# Patient Record
Sex: FEMALE | Race: WHITE | Employment: OTHER
[De-identification: names, ages, dates, MRNs, and addresses within clinical notes are randomized per-mention and may not be internally consistent; named-entity substitution may affect disease eponyms.]

---

## 2017-01-26 ENCOUNTER — OFFICE VISIT (OUTPATIENT)
Dept: ONCOLOGY | Facility: CLINIC | Age: 62
End: 2017-01-26

## 2017-01-26 ENCOUNTER — TELEPHONE (OUTPATIENT)
Dept: ONCOLOGY | Facility: CLINIC | Age: 62
End: 2017-01-26

## 2017-01-26 VITALS
BODY MASS INDEX: 33.3 KG/M2 | SYSTOLIC BLOOD PRESSURE: 118 MMHG | DIASTOLIC BLOOD PRESSURE: 70 MMHG | TEMPERATURE: 97.9 F | WEIGHT: 200.1 LBS | RESPIRATION RATE: 16 BRPM | HEART RATE: 66 BPM

## 2017-01-26 DIAGNOSIS — M85.80 OSTEOPENIA: ICD-10-CM

## 2017-01-26 DIAGNOSIS — M81.0 AGE-RELATED OSTEOPOROSIS WITHOUT CURRENT PATHOLOGICAL FRACTURE: ICD-10-CM

## 2017-01-26 DIAGNOSIS — C50.111 MALIGNANT NEOPLASM OF CENTRAL PORTION OF RIGHT FEMALE BREAST (HCC): Primary | ICD-10-CM

## 2017-01-26 PROCEDURE — G8419 CALC BMI OUT NRM PARAM NOF/U: HCPCS | Performed by: INTERNAL MEDICINE

## 2017-01-26 PROCEDURE — 3014F SCREEN MAMMO DOC REV: CPT | Performed by: INTERNAL MEDICINE

## 2017-01-26 PROCEDURE — 3017F COLORECTAL CA SCREEN DOC REV: CPT | Performed by: INTERNAL MEDICINE

## 2017-01-26 PROCEDURE — G8427 DOCREV CUR MEDS BY ELIG CLIN: HCPCS | Performed by: INTERNAL MEDICINE

## 2017-01-26 PROCEDURE — 99214 OFFICE O/P EST MOD 30 MIN: CPT | Performed by: INTERNAL MEDICINE

## 2017-01-26 PROCEDURE — 4005F PHARM THX FOR OP RXD: CPT | Performed by: INTERNAL MEDICINE

## 2017-01-26 PROCEDURE — 1036F TOBACCO NON-USER: CPT | Performed by: INTERNAL MEDICINE

## 2017-01-26 PROCEDURE — G8484 FLU IMMUNIZE NO ADMIN: HCPCS | Performed by: INTERNAL MEDICINE

## 2017-01-26 RX ORDER — AMOXICILLIN AND CLAVULANATE POTASSIUM 875; 125 MG/1; MG/1
1 TABLET, FILM COATED ORAL 2 TIMES DAILY
COMMUNITY

## 2017-01-26 RX ORDER — ANASTROZOLE 1 MG/1
1 TABLET ORAL DAILY
Qty: 30 TABLET | Refills: 6 | Status: SHIPPED | OUTPATIENT
Start: 2017-01-26 | End: 2017-03-01 | Stop reason: SINTOL

## 2017-03-01 ENCOUNTER — TELEPHONE (OUTPATIENT)
Dept: ONCOLOGY | Facility: CLINIC | Age: 62
End: 2017-03-01

## 2017-03-09 ENCOUNTER — TELEPHONE (OUTPATIENT)
Dept: ONCOLOGY | Facility: CLINIC | Age: 62
End: 2017-03-09

## 2017-03-09 ENCOUNTER — OFFICE VISIT (OUTPATIENT)
Dept: ONCOLOGY | Facility: CLINIC | Age: 62
End: 2017-03-09

## 2017-03-09 ENCOUNTER — HOSPITAL ENCOUNTER (OUTPATIENT)
Dept: INFUSION THERAPY | Facility: MEDICAL CENTER | Age: 62
Discharge: HOME | End: 2017-03-09
Payer: MEDICARE

## 2017-03-09 ENCOUNTER — HOSPITAL ENCOUNTER (OUTPATIENT)
Age: 62
Discharge: HOME | End: 2017-03-09
Payer: MEDICARE

## 2017-03-09 VITALS
DIASTOLIC BLOOD PRESSURE: 70 MMHG | RESPIRATION RATE: 20 BRPM | SYSTOLIC BLOOD PRESSURE: 120 MMHG | HEART RATE: 68 BPM | TEMPERATURE: 98 F

## 2017-03-09 DIAGNOSIS — C50.111 MALIGNANT NEOPLASM OF CENTRAL PORTION OF RIGHT FEMALE BREAST (HCC): ICD-10-CM

## 2017-03-09 DIAGNOSIS — C50.111 MALIGNANT NEOPLASM OF CENTRAL PORTION OF RIGHT FEMALE BREAST (HCC): Primary | ICD-10-CM

## 2017-03-09 PROCEDURE — 99214 OFFICE O/P EST MOD 30 MIN: CPT | Performed by: INTERNAL MEDICINE

## 2017-03-09 PROCEDURE — 3017F COLORECTAL CA SCREEN DOC REV: CPT | Performed by: INTERNAL MEDICINE

## 2017-03-09 PROCEDURE — 3014F SCREEN MAMMO DOC REV: CPT | Performed by: INTERNAL MEDICINE

## 2017-03-09 PROCEDURE — 6360000002 HC RX W HCPCS

## 2017-03-09 PROCEDURE — 1036F TOBACCO NON-USER: CPT | Performed by: INTERNAL MEDICINE

## 2017-03-09 PROCEDURE — 2580000003 HC RX 258

## 2017-03-09 PROCEDURE — 99212 OFFICE O/P EST SF 10 MIN: CPT | Performed by: INTERNAL MEDICINE

## 2017-03-09 PROCEDURE — 96523 IRRIG DRUG DELIVERY DEVICE: CPT

## 2017-03-09 RX ORDER — EXEMESTANE 25 MG/1
25 TABLET ORAL DAILY
Qty: 30 TABLET | Refills: 3 | Status: SHIPPED | OUTPATIENT
Start: 2017-03-09 | End: 2017-04-20 | Stop reason: SDUPTHER

## 2017-03-09 RX ORDER — SODIUM CHLORIDE 0.9 % (FLUSH) 0.9 %
20 SYRINGE (ML) INJECTION PRN
Status: CANCELLED | OUTPATIENT
Start: 2017-03-09

## 2017-03-09 RX ORDER — SODIUM CHLORIDE 0.9 % (FLUSH) 0.9 %
20 SYRINGE (ML) INJECTION PRN
Status: DISCONTINUED | OUTPATIENT
Start: 2017-03-09 | End: 2017-03-10 | Stop reason: HOSPADM

## 2017-03-09 RX ORDER — HEPARIN SODIUM (PORCINE) LOCK FLUSH IV SOLN 100 UNIT/ML 100 UNIT/ML
500 SOLUTION INTRAVENOUS PRN
Status: DISCONTINUED | OUTPATIENT
Start: 2017-03-09 | End: 2017-03-10 | Stop reason: HOSPADM

## 2017-03-09 RX ORDER — HEPARIN SODIUM (PORCINE) LOCK FLUSH IV SOLN 100 UNIT/ML 100 UNIT/ML
500 SOLUTION INTRAVENOUS PRN
Status: CANCELLED | OUTPATIENT
Start: 2017-03-09

## 2017-03-09 RX ADMIN — SODIUM CHLORIDE, PRESERVATIVE FREE 500 UNITS: 5 INJECTION INTRAVENOUS at 14:40

## 2017-03-09 RX ADMIN — Medication 10 ML: at 14:41

## 2017-03-09 NOTE — PROGRESS NOTES
Met with patient and daughter for survivor visit. Questions and concerns addressed. Education materials and treatment summary given. Instructed to call as needed.

## 2017-03-09 NOTE — IP AVS SNAPSHOT
After Visit Summary    Medication List for Home    Based on the information you provided to us as well as any changes during this visit, the following is your updated medication list.  Compare this with your prescription bottles at home. If you have any questions or concerns, contact your primary care physician's office. Daily Medication List (This medication list can be shared with any healthcare provider who is helping you manage your medications)      ASK your doctor about these medications if you have questions        Last Dose    Next Dose Due AM NOON PM NIGHT    ALPRAZolam 0.5 MG tablet   Commonly known as:  XANAX   Take 0.5 mg by mouth 4 times daily. amoxicillin-clavulanate 875-125 MG per tablet   Commonly known as:  AUGMENTIN   Take 1 tablet by mouth 2 times daily                                         aspirin 81 MG EC tablet   Take 1 tablet by mouth daily                                         citalopram 20 MG tablet   Commonly known as:  CELEXA   Take 40 mg by mouth daily                                         cloNIDine 0.1 MG tablet   Commonly known as:  CATAPRES   Take 0.1 mg by mouth 2 times daily. cyanocobalamin 1000 MCG/ML injection   Inject 1,000 mcg into the muscle every 7 days. 1-2 cc per week                                         exemestane 25 MG chemo tablet   Commonly known as:  AROMASIN   Take 1 tablet by mouth daily                                         hydrochlorothiazide 25 MG tablet   Commonly known as:  HYDRODIURIL   Take 25 mg by mouth daily. levothyroxine 175 MCG tablet   Commonly known as:  SYNTHROID   Take 1 tablet by mouth daily                                         lidocaine-prilocaine 2.5-2.5 % cream   Commonly known as:  EMLA   Apply topically as needed.                                          magnesium gluconate 500 MG tablet Commonly known as:  MAGONATE   Take 600 mg by mouth 2 times daily                                         omeprazole 20 MG delayed release capsule   Commonly known as:  PRILOSEC   Take 20 mg by mouth daily. oxyCODONE 15 MG immediate release tablet   Commonly known as:  OXY-IR   Take 30 mg by mouth 4 times daily as needed for Pain                                         potassium chloride 20 MEQ packet   Commonly known as:  KLOR-CON   Take 20 mEq by mouth daily                                         PROBIOTIC DAILY PO   Take  by mouth daily. prochlorperazine 10 MG tablet   Commonly known as:  COMPAZINE   TAKE 1 TABLET BY MOUTH EVERY 6 HOURS AS NEEDED                                         therapeutic multivitamin-minerals tablet   Take 1 tablet by mouth daily. tiZANidine 4 MG tablet   Commonly known as:  ZANAFLEX   Take 4 mg by mouth 3 times daily as needed. Where to Get Your Medications      These medications were sent to [address] [address] [facility] - P [phone] - F [phone]  [address] [facility] [address] [facility] [address]     Phone:  [phone]     exemestane 25 MG chemo tablet               Allergies as of 3/9/2017        Reactions    Neurontin [Gabapentin]       Immunizations as of 3/9/2017     Name Date Dose VIS Date Route    Influenza Virus Vaccine 10/22/2015 0.5 mL 8/7/2015 Intramuscular         After Visit Summary    This summary was created for you. Thank you for entrusting your care to us.   The following information includes details about your hospital/visit stay along with steps you should take to help with your recovery once you leave the hospital.  In this packet, you will find information about the topics listed below:    · Instructions about your medications including a list of your home medications  · A summary of your hospital visit  · Follow-up appointments once you have left the hospital  · Your care plan at home      You may receive a survey regarding the care you received during your stay. Your input is valuable to us. We encourage you to complete and return your survey in the envelope provided. We hope you will choose us in the future for your healthcare needs. Patient Information     Patient Name ABDON Coombs 1955      Care Provided at:     Name             [address] [facility] [address]              Your Visit    Here you will find information about your visit, including the reason for your visit. Please take this sheet with you when you visit your doctor or other health care provider in the future. It will help determine the best possible medical care for you at that time. If you have any questions once you leave the hospital, please call the department phone number listed below. Why you were here     Your primary diagnosis was:  Not on File      Visit Information     Date & Time Department Dept. Phone    3/9/2017 Becki Barone 47 [phone]       Follow-up Appointments    Below is a list of your follow-up and future appointments. This may not be a complete list as you may have made appointments directly with providers that we are not aware of or your providers may have made some for you. Please call your providers to confirm appointments. It is important to keep your appointments. Please bring your current insurance card, photo ID, co-pay, and all medication bottles to your appointment. If self-pay, payment is expected at the time of service. Future Appointments     3/29/2017 1:00 PM     Appointment with TOMAS MAMMO TECH 1; TOMAS DEXA RM at [address] ([phone])   Discontinue taking calcium 24 hours prior to dexa scan.     [address] [facility] [address]       2017 2:30 PM Appointment with MITUL MILLS 05 at . [address] [phone] [place]       4/20/2017 3:00 PM     Appointment with Cecilia Delong MD at [facility] ([phone])   Please arrive 15 minutes prior to appointment time, bring insurance card and photo ID.    [place] [id number]              Care Plan Once You Return Home    This section includes instructions you will need to follow once you leave the hospital.  Your care team will discuss these with you, so you and those caring for you know how to best care for your health needs at home. This section may also include educational information about certain health topics that may be of help to you. MyChart Signup     Our records indicate that you have declined MyChart signup. View your information online  ? Review your current list of  medications, immunization, and allergies. ? Review your future test results online . ? Review your discharge instructions provided by your caregivers at discharge    Certain functionality such as prescription refills, scheduling appointments or sending messages to your provider are not activated if your provider does not use [employer] in his/her office    For questions regarding your MyChart account call [phone]. If you have a clinical question, please call your doctor's office. The information on all pages of the After Visit Summary has been reviewed with me, the patient and/or responsible adult, by my health care provider(s). I had the opportunity to ask questions regarding this information. I understand I should dispose of my armband safely at home to protect my health information. A complete copy of the After Visit Summary has been given to me, the patient and/or responsible adult.            Patient Signature/Responsible Adult:____________________    Clinician Signature:_____________________ Date:_____________________    Time:_____________________

## 2017-03-09 NOTE — PROGRESS NOTES
Holden Dhruv arrives per wheelchair with daughter  Order for port flush  Lt chest port accessed per policy with #34 G 3/4 L Bah needle   Good blood return noted  Bah needle flushed per protocol and bah needle removed with needle intact  Band aid applied  Discharged with daughter to home

## 2017-03-09 NOTE — IP AVS SNAPSHOT
Patient Information     Patient Name ABDON Ruiz 1955         This is your updated medication list to keep with you all times      ASK your doctor about these medications     ALPRAZolam 0.5 MG tablet   Commonly known as:  XANAX       amoxicillin-clavulanate 875-125 MG per tablet   Commonly known as:  AUGMENTIN       aspirin 81 MG EC tablet   Take 1 tablet by mouth daily       citalopram 20 MG tablet   Commonly known as:  CELEXA       cloNIDine 0.1 MG tablet   Commonly known as:  CATAPRES       cyanocobalamin 1000 MCG/ML injection       exemestane 25 MG chemo tablet   Commonly known as:  AROMASIN   Take 1 tablet by mouth daily       hydrochlorothiazide 25 MG tablet   Commonly known as:  HYDRODIURIL       levothyroxine 175 MCG tablet   Commonly known as:  SYNTHROID   Take 1 tablet by mouth daily       lidocaine-prilocaine 2.5-2.5 % cream   Commonly known as:  EMLA   Apply topically as needed.        magnesium gluconate 500 MG tablet   Commonly known as:  MAGONATE       omeprazole 20 MG delayed release capsule   Commonly known as:  PRILOSEC       oxyCODONE 15 MG immediate release tablet   Commonly known as:  OXY-IR       potassium chloride 20 MEQ packet   Commonly known as:  KLOR-CON   Take 20 mEq by mouth daily       PROBIOTIC DAILY PO       prochlorperazine 10 MG tablet   Commonly known as:  COMPAZINE   TAKE 1 TABLET BY MOUTH EVERY 6 HOURS AS NEEDED       therapeutic multivitamin-minerals tablet       tiZANidine 4 MG tablet   Commonly known as:  Deloris Lew

## 2017-03-17 ENCOUNTER — TELEPHONE (OUTPATIENT)
Dept: INFUSION THERAPY | Facility: MEDICAL CENTER | Age: 62
End: 2017-03-17

## 2017-04-20 ENCOUNTER — OFFICE VISIT (OUTPATIENT)
Dept: ONCOLOGY | Age: 62
End: 2017-04-20
Payer: MEDICARE

## 2017-04-20 ENCOUNTER — HOSPITAL ENCOUNTER (OUTPATIENT)
Dept: INFUSION THERAPY | Facility: MEDICAL CENTER | Age: 62
Discharge: HOME | End: 2017-04-20
Payer: MEDICARE

## 2017-04-20 ENCOUNTER — TELEPHONE (OUTPATIENT)
Dept: ONCOLOGY | Age: 62
End: 2017-04-20

## 2017-04-20 VITALS
TEMPERATURE: 98.2 F | RESPIRATION RATE: 18 BRPM | DIASTOLIC BLOOD PRESSURE: 61 MMHG | SYSTOLIC BLOOD PRESSURE: 114 MMHG | HEART RATE: 70 BPM

## 2017-04-20 DIAGNOSIS — C50.111 MALIGNANT NEOPLASM OF CENTRAL PORTION OF RIGHT FEMALE BREAST (HCC): Primary | ICD-10-CM

## 2017-04-20 PROCEDURE — 6360000002 HC RX W HCPCS: Performed by: INTERNAL MEDICINE

## 2017-04-20 PROCEDURE — 96523 IRRIG DRUG DELIVERY DEVICE: CPT

## 2017-04-20 PROCEDURE — 99212 OFFICE O/P EST SF 10 MIN: CPT

## 2017-04-20 PROCEDURE — 3014F SCREEN MAMMO DOC REV: CPT | Performed by: INTERNAL MEDICINE

## 2017-04-20 PROCEDURE — 2580000003 HC RX 258: Performed by: INTERNAL MEDICINE

## 2017-04-20 PROCEDURE — G8417 CALC BMI ABV UP PARAM F/U: HCPCS | Performed by: INTERNAL MEDICINE

## 2017-04-20 PROCEDURE — 99214 OFFICE O/P EST MOD 30 MIN: CPT | Performed by: INTERNAL MEDICINE

## 2017-04-20 PROCEDURE — 1036F TOBACCO NON-USER: CPT | Performed by: INTERNAL MEDICINE

## 2017-04-20 PROCEDURE — G8427 DOCREV CUR MEDS BY ELIG CLIN: HCPCS | Performed by: INTERNAL MEDICINE

## 2017-04-20 PROCEDURE — 3017F COLORECTAL CA SCREEN DOC REV: CPT | Performed by: INTERNAL MEDICINE

## 2017-04-20 RX ORDER — FUROSEMIDE 40 MG/1
40 TABLET ORAL DAILY
Qty: 30 TABLET | Refills: 1 | Status: SHIPPED | OUTPATIENT
Start: 2017-04-20 | End: 2017-06-06 | Stop reason: SDUPTHER

## 2017-04-20 RX ORDER — EXEMESTANE 25 MG/1
25 TABLET ORAL DAILY
Qty: 30 TABLET | Refills: 3 | Status: SHIPPED | OUTPATIENT
Start: 2017-04-20 | End: 2018-11-01 | Stop reason: ALTCHOICE

## 2017-04-20 RX ORDER — SODIUM CHLORIDE 0.9 % (FLUSH) 0.9 %
10 SYRINGE (ML) INJECTION PRN
Status: DISCONTINUED | OUTPATIENT
Start: 2017-04-20 | End: 2017-04-21 | Stop reason: HOSPADM

## 2017-04-20 RX ADMIN — SODIUM CHLORIDE, PRESERVATIVE FREE 500 UNITS: 5 INJECTION INTRAVENOUS at 15:35

## 2017-04-20 RX ADMIN — Medication 10 ML: at 15:36

## 2017-05-04 ENCOUNTER — TELEPHONE (OUTPATIENT)
Dept: CASE MANAGEMENT | Age: 62
End: 2017-05-04

## 2017-05-24 ENCOUNTER — HOSPITAL ENCOUNTER (OUTPATIENT)
Dept: INFUSION THERAPY | Facility: MEDICAL CENTER | Age: 62
Discharge: HOME | End: 2017-05-24
Payer: MEDICARE

## 2017-05-24 ENCOUNTER — HOSPITAL ENCOUNTER (OUTPATIENT)
Dept: MAMMOGRAPHY | Age: 62
Discharge: HOME | End: 2017-05-24
Payer: MEDICARE

## 2017-05-24 VITALS
RESPIRATION RATE: 18 BRPM | SYSTOLIC BLOOD PRESSURE: 145 MMHG | DIASTOLIC BLOOD PRESSURE: 64 MMHG | HEART RATE: 65 BPM | TEMPERATURE: 98.3 F

## 2017-05-24 DIAGNOSIS — M81.0 AGE-RELATED OSTEOPOROSIS WITHOUT CURRENT PATHOLOGICAL FRACTURE: ICD-10-CM

## 2017-05-24 DIAGNOSIS — C50.111 MALIGNANT NEOPLASM OF CENTRAL PORTION OF RIGHT FEMALE BREAST (HCC): ICD-10-CM

## 2017-05-24 DIAGNOSIS — M85.80 OSTEOPENIA: ICD-10-CM

## 2017-05-24 PROCEDURE — 77080 DXA BONE DENSITY AXIAL: CPT

## 2017-05-24 PROCEDURE — 96523 IRRIG DRUG DELIVERY DEVICE: CPT

## 2017-05-24 PROCEDURE — 6360000002 HC RX W HCPCS

## 2017-05-24 PROCEDURE — 2580000003 HC RX 258

## 2017-05-24 RX ORDER — HEPARIN SODIUM (PORCINE) LOCK FLUSH IV SOLN 100 UNIT/ML 100 UNIT/ML
500 SOLUTION INTRAVENOUS PRN
Status: DISCONTINUED | OUTPATIENT
Start: 2017-05-24 | End: 2017-05-25 | Stop reason: HOSPADM

## 2017-05-24 RX ORDER — HEPARIN SODIUM (PORCINE) LOCK FLUSH IV SOLN 100 UNIT/ML 100 UNIT/ML
500 SOLUTION INTRAVENOUS PRN
Status: CANCELLED | OUTPATIENT
Start: 2017-05-24

## 2017-05-24 RX ORDER — SODIUM CHLORIDE 0.9 % (FLUSH) 0.9 %
20 SYRINGE (ML) INJECTION PRN
Status: CANCELLED | OUTPATIENT
Start: 2017-05-24

## 2017-05-24 RX ORDER — SODIUM CHLORIDE 0.9 % (FLUSH) 0.9 %
20 SYRINGE (ML) INJECTION PRN
Status: DISCONTINUED | OUTPATIENT
Start: 2017-05-24 | End: 2017-05-25 | Stop reason: HOSPADM

## 2017-05-24 RX ADMIN — Medication 20 ML: at 14:53

## 2017-05-24 RX ADMIN — SODIUM CHLORIDE, PRESERVATIVE FREE 500 UNITS: 5 INJECTION INTRAVENOUS at 14:53

## 2017-05-24 NOTE — PROGRESS NOTES
Pt. Arrives for port flush. Pt. C/o lower leg and ankle edema, but better since starting lasix. Pt. Spoke with MD today and legs evaluated, MD suggesting pt. See PCP for evaluation . MD suggesting stockings for residual edema, pt. Worried about celulitis. PCP could adjust lasix and continue to order ATB if needed.

## 2017-06-09 RX ORDER — FUROSEMIDE 40 MG/1
TABLET ORAL
Qty: 30 TABLET | Refills: 1 | Status: SHIPPED | OUTPATIENT
Start: 2017-06-09 | End: 2017-10-13 | Stop reason: SDUPTHER

## 2017-06-13 ENCOUNTER — HOSPITAL ENCOUNTER (OUTPATIENT)
Dept: INFUSION THERAPY | Facility: MEDICAL CENTER | Age: 62
Discharge: HOME | End: 2017-06-13
Payer: MEDICARE

## 2017-06-13 VITALS
HEART RATE: 65 BPM | SYSTOLIC BLOOD PRESSURE: 117 MMHG | RESPIRATION RATE: 20 BRPM | TEMPERATURE: 97.9 F | DIASTOLIC BLOOD PRESSURE: 58 MMHG

## 2017-06-13 PROCEDURE — 96523 IRRIG DRUG DELIVERY DEVICE: CPT

## 2017-06-13 PROCEDURE — 2580000003 HC RX 258: Performed by: INTERNAL MEDICINE

## 2017-06-13 PROCEDURE — 6360000002 HC RX W HCPCS: Performed by: INTERNAL MEDICINE

## 2017-06-13 RX ORDER — SODIUM CHLORIDE 0.9 % (FLUSH) 0.9 %
20 SYRINGE (ML) INJECTION PRN
Status: DISCONTINUED | OUTPATIENT
Start: 2017-06-13 | End: 2017-06-14 | Stop reason: HOSPADM

## 2017-06-13 RX ADMIN — SODIUM CHLORIDE, PRESERVATIVE FREE 500 UNITS: 5 INJECTION INTRAVENOUS at 14:42

## 2017-06-13 RX ADMIN — Medication 20 ML: at 14:42

## 2017-06-13 NOTE — PROGRESS NOTES
Patient arrives to the clinic today with her daughter for a port flush. Patient arrives via wheelchair. Port accessed per policy, good blood return obtained. Gripper needle removed. Patient leaves in stable condition with her daughter.

## 2017-07-24 ENCOUNTER — HOSPITAL ENCOUNTER (OUTPATIENT)
Facility: MEDICAL CENTER | Age: 62
End: 2017-07-24
Payer: MEDICARE

## 2017-08-14 ENCOUNTER — HOSPITAL ENCOUNTER (OUTPATIENT)
Facility: MEDICAL CENTER | Age: 62
End: 2017-08-14
Payer: MEDICARE

## 2017-08-15 ENCOUNTER — TELEPHONE (OUTPATIENT)
Dept: INFUSION THERAPY | Facility: MEDICAL CENTER | Age: 62
End: 2017-08-15

## 2017-08-15 ENCOUNTER — APPOINTMENT (OUTPATIENT)
Dept: INFUSION THERAPY | Facility: MEDICAL CENTER | Age: 62
End: 2017-08-15
Payer: MEDICARE

## 2017-08-18 ENCOUNTER — HOSPITAL ENCOUNTER (OUTPATIENT)
Dept: INFUSION THERAPY | Facility: MEDICAL CENTER | Age: 62
Discharge: HOME | End: 2017-08-18
Payer: MEDICARE

## 2017-08-18 VITALS
DIASTOLIC BLOOD PRESSURE: 60 MMHG | TEMPERATURE: 98.5 F | RESPIRATION RATE: 18 BRPM | SYSTOLIC BLOOD PRESSURE: 121 MMHG | HEART RATE: 60 BPM

## 2017-08-18 DIAGNOSIS — C50.111 MALIGNANT NEOPLASM OF CENTRAL PORTION OF RIGHT FEMALE BREAST (HCC): ICD-10-CM

## 2017-08-18 PROCEDURE — 2580000003 HC RX 258

## 2017-08-18 PROCEDURE — 96523 IRRIG DRUG DELIVERY DEVICE: CPT

## 2017-08-18 PROCEDURE — 6360000002 HC RX W HCPCS

## 2017-08-18 RX ORDER — HEPARIN SODIUM (PORCINE) LOCK FLUSH IV SOLN 100 UNIT/ML 100 UNIT/ML
500 SOLUTION INTRAVENOUS PRN
Status: DISCONTINUED | OUTPATIENT
Start: 2017-08-18 | End: 2017-08-19 | Stop reason: HOSPADM

## 2017-08-18 RX ORDER — SODIUM CHLORIDE 0.9 % (FLUSH) 0.9 %
20 SYRINGE (ML) INJECTION PRN
Status: CANCELLED | OUTPATIENT
Start: 2017-08-18

## 2017-08-18 RX ORDER — SODIUM CHLORIDE 0.9 % (FLUSH) 0.9 %
20 SYRINGE (ML) INJECTION PRN
Status: DISCONTINUED | OUTPATIENT
Start: 2017-08-18 | End: 2017-08-19 | Stop reason: HOSPADM

## 2017-08-18 RX ORDER — HEPARIN SODIUM (PORCINE) LOCK FLUSH IV SOLN 100 UNIT/ML 100 UNIT/ML
500 SOLUTION INTRAVENOUS PRN
Status: CANCELLED | OUTPATIENT
Start: 2017-08-18

## 2017-08-18 RX ADMIN — SODIUM CHLORIDE, PRESERVATIVE FREE 500 UNITS: 5 INJECTION INTRAVENOUS at 15:35

## 2017-08-18 RX ADMIN — Medication 20 ML: at 15:35

## 2017-08-18 NOTE — PROGRESS NOTES
Pt. Arrives for [facility], has upcoming dental procedure. , but otherwise denies c/o. Pt. Adjusting MD F/U to coordinate with upcoming [facility] flush.

## 2017-09-15 ENCOUNTER — HOSPITAL ENCOUNTER (OUTPATIENT)
Facility: MEDICAL CENTER | Age: 62
End: 2017-09-15
Payer: MEDICARE

## 2017-09-25 ENCOUNTER — HOSPITAL ENCOUNTER (OUTPATIENT)
Facility: MEDICAL CENTER | Age: 62
End: 2017-09-25
Payer: MEDICARE

## 2017-10-13 RX ORDER — FUROSEMIDE 40 MG/1
TABLET ORAL
Qty: 30 TABLET | Refills: 1 | Status: SHIPPED | OUTPATIENT
Start: 2017-10-13 | End: 2018-11-01 | Stop reason: ALTCHOICE

## 2017-10-16 ENCOUNTER — HOSPITAL ENCOUNTER (OUTPATIENT)
Facility: MEDICAL CENTER | Age: 62
End: 2017-10-16
Payer: MEDICARE

## 2017-10-17 ENCOUNTER — HOSPITAL ENCOUNTER (OUTPATIENT)
Dept: INFUSION THERAPY | Facility: MEDICAL CENTER | Age: 62
End: 2017-10-17

## 2017-11-06 ENCOUNTER — HOSPITAL ENCOUNTER (OUTPATIENT)
Facility: MEDICAL CENTER | Age: 62
End: 2017-11-06
Payer: MEDICARE

## 2017-11-07 ENCOUNTER — OFFICE VISIT (OUTPATIENT)
Dept: ONCOLOGY | Age: 62
End: 2017-11-07
Payer: MEDICARE

## 2017-11-07 ENCOUNTER — HOSPITAL ENCOUNTER (OUTPATIENT)
Dept: INFUSION THERAPY | Facility: MEDICAL CENTER | Age: 62
Discharge: HOME | End: 2017-11-07
Payer: MEDICARE

## 2017-11-07 ENCOUNTER — TELEPHONE (OUTPATIENT)
Dept: ONCOLOGY | Age: 62
End: 2017-11-07

## 2017-11-07 VITALS
WEIGHT: 204.5 LBS | RESPIRATION RATE: 18 BRPM | SYSTOLIC BLOOD PRESSURE: 146 MMHG | TEMPERATURE: 98.2 F | BODY MASS INDEX: 34.03 KG/M2 | DIASTOLIC BLOOD PRESSURE: 79 MMHG | HEART RATE: 73 BPM

## 2017-11-07 DIAGNOSIS — Z17.0 MALIGNANT NEOPLASM OF CENTRAL PORTION OF RIGHT BREAST IN FEMALE, ESTROGEN RECEPTOR POSITIVE (HCC): Primary | ICD-10-CM

## 2017-11-07 DIAGNOSIS — C50.111 MALIGNANT NEOPLASM OF CENTRAL PORTION OF RIGHT BREAST IN FEMALE, ESTROGEN RECEPTOR POSITIVE (HCC): Primary | ICD-10-CM

## 2017-11-07 PROCEDURE — 99211 OFF/OP EST MAY X REQ PHY/QHP: CPT

## 2017-11-07 PROCEDURE — G8417 CALC BMI ABV UP PARAM F/U: HCPCS | Performed by: INTERNAL MEDICINE

## 2017-11-07 PROCEDURE — 1036F TOBACCO NON-USER: CPT | Performed by: INTERNAL MEDICINE

## 2017-11-07 PROCEDURE — 3014F SCREEN MAMMO DOC REV: CPT | Performed by: INTERNAL MEDICINE

## 2017-11-07 PROCEDURE — 99214 OFFICE O/P EST MOD 30 MIN: CPT | Performed by: INTERNAL MEDICINE

## 2017-11-07 PROCEDURE — G8427 DOCREV CUR MEDS BY ELIG CLIN: HCPCS | Performed by: INTERNAL MEDICINE

## 2017-11-07 PROCEDURE — 6360000002 HC RX W HCPCS: Performed by: INTERNAL MEDICINE

## 2017-11-07 PROCEDURE — 3017F COLORECTAL CA SCREEN DOC REV: CPT | Performed by: INTERNAL MEDICINE

## 2017-11-07 PROCEDURE — 96523 IRRIG DRUG DELIVERY DEVICE: CPT

## 2017-11-07 PROCEDURE — G8484 FLU IMMUNIZE NO ADMIN: HCPCS | Performed by: INTERNAL MEDICINE

## 2017-11-07 PROCEDURE — 2580000003 HC RX 258: Performed by: INTERNAL MEDICINE

## 2017-11-07 RX ORDER — HEPARIN SODIUM (PORCINE) LOCK FLUSH IV SOLN 100 UNIT/ML 100 UNIT/ML
500 SOLUTION INTRAVENOUS PRN
Status: DISCONTINUED | OUTPATIENT
Start: 2017-11-07 | End: 2017-11-08 | Stop reason: HOSPADM

## 2017-11-07 RX ORDER — SODIUM CHLORIDE 0.9 % (FLUSH) 0.9 %
20 SYRINGE (ML) INJECTION PRN
Status: DISCONTINUED | OUTPATIENT
Start: 2017-11-07 | End: 2017-11-08 | Stop reason: HOSPADM

## 2017-11-07 RX ADMIN — SODIUM CHLORIDE, PRESERVATIVE FREE 500 UNITS: 5 INJECTION INTRAVENOUS at 15:51

## 2017-11-07 RX ADMIN — Medication 20 ML: at 15:44

## 2017-11-07 RX ADMIN — Medication 20 ML: at 15:51

## 2017-11-07 NOTE — PROGRESS NOTES
Pt arrives per wheelchair and daughter with pt. Pt tolerated port flush well and pt seen per md and then to  per wheelchair with daughter.

## 2017-11-07 NOTE — PROGRESS NOTES
_         Reason for the visit:   Chief Complaint   Patient presents with   Georganna Duverney Follow-up     [facility]   //  Port flush , no labs ordered. Blood drawn if needed. Has some lower extremity edema, feels from pain pump. Pertinent Clinical Problems/ Treatments:    1. Right Breast invasive ductal carcinoma, Clinical stage , T2N0M0, ER strongly positive/TN strongly positive/HER-2 +3 by IHC ,initial diagnosis Nov 2014. Pathological stage [id number] after neoadjuvant chemotherapy. 2. S/p traumatic back injury, led to delayed RX of breast cancer  3. TCH-P started 9/9/15. Cycle #6 on 2/1/16. Herceptin continued every three weeks. Completed one year on 11/30/16. 4. Rt breast lumpectomy 5/27/16.   5. Radiation therapy not given due to prolonged gap after chemotherapy due to health problems and incompliance. 6. Started Arimidex January 2017. Discontinued due to side effects. Prescribed  Aromasin March 2017. Not started yet. He decided not to use it but she is worried about side effects. 7. Iron deficiency anemia    Summary of the case: The patient was diagnosed with breast invasive ductal carcinoma in right breast with no torrie involvement in 10/2014  on biopsy. She was sent to [facility], oncologist last year and this time she was decided to have chemotherapy. However, in the beginning of this year, she slipped on black ice, broke her T8 spine, therefore had to go through kyphoplasty. She states in between her pain pump was re-arranged. It took long time Almost 6 month for her to recover from multiple surgery, and finally she was started on chemotherapy almost a year later in 9/2015. She was started on TCH-P and it was generally well tolerated. INTERVAL HISTORY:    Patient is here today for routine follow-up visit breast cancer after starting arimidex   Pt was started on Arimidex last visit on  Jan 2017 . She started to develop hot flashes , mood disorders .  She also had suicidal ideation after the start of 11/30/2016    HCT 32.9 (L) 11/30/2016    MCV 84.2 11/30/2016     11/30/2016       Chemistry        Component Value Date/Time     11/30/2016 1328    K 4.1 11/30/2016 1328    CL 97 (L) 11/30/2016 1328    CO2 29 11/30/2016 1328    BUN 15 11/30/2016 1328    CREATININE 0.61 11/30/2016 1328        Component Value Date/Time    CALCIUM 8.9 11/30/2016 1328    ALKPHOS 79 11/30/2016 1328    AST 25 11/30/2016 1328    ALT 15 11/30/2016 1328    BILITOT 0.28 (L) 11/30/2016 1328          Pathology from 5/27/16:    Final Pathologic Diagnosis  STAGING SUMMARY    Diagnosis:          Residual invasive ductal carcinoma, post neoadjuvant, grade 2, with prominent treatment response. TNM Stage:          ympT1a y(sn)pN0(i-)  Margin Status:          Negative; closest margin true deep inferior, 0.2 cm. Estrogen Receptor:     Previously positive ([facility], [id number], 11/6/2014). Progesterone Receptor:     Previously positive ([facility], [id number], 11/6/2014). HER2 IHC:          Previously positive ([facility], [id number], 11/6/2014).          1.  [place] lymph nodes, right \"hot\", excision:  ·          Two lymph nodes with dermatopathic changes, immunohistochemically negative for malignancy (0/2). ·     No features of treatment effect seen. 2.  [place] lymph node, right \"warm\", excision:  ·          One lymph node with dermatopathic changes, immunohistochemically negative for malignancy (0/1). ·     Focal fibrous scarring and xanthomatous inflammation seen, suggestive of treatment effect with complete response. 3.  Breast, right, needle localized lumpectomy:  ·          Microscopic residual foci of invasive ductal carcinoma, each less than 0.1 cm in length, within fibrous scar, post-treatment grade 2 (definite treatment response). ·     Closest margin superior, 0.1 cm from malignancy. ·     Fibrous scar transected by deep-inferior margin. ·     No lymph-vascular and no perineural invasion seen.   ·     Skin, negative for malignancy. ·     No in situ component seen. ·     Fibroadenomatoid changes. 4.  Breast, right, true superior medial margin, lumpectomy:  ·          Benign breast tissue with hyalinizing fibrosis and atrophic changes. ·     No malignancy seen; margin is benign. 5.  Breast, right, true deep inferior margin, lumpectomy:  ·          Microscopic focus of invasive ductal carcinoma, 0.5 cm in length, within fibrous scar and xanthomatous inflammation.    ·     Invasive carcinoma is 0.2 cm from true margin. 6.  Breast, right, true lateral margin, lumpectomy:  ·          Benign breast tissue with atrophic changes. ·     No malignancy seen; margin is benign. Pathologic stage:  ympT1a y(sn)pN0(i-)    Lab Results   Component Value Date    WBC 5.8 11/30/2016    HGB 11.1 (L) 11/30/2016    HCT 32.9 (L) 11/30/2016    MCV 84.2 11/30/2016     11/30/2016       Chemistry        Component Value Date/Time     11/30/2016 1328    K 4.1 11/30/2016 1328    CL 97 (L) 11/30/2016 1328    CO2 29 11/30/2016 1328    BUN 15 11/30/2016 1328    CREATININE 0.61 11/30/2016 1328        Component Value Date/Time    CALCIUM 8.9 11/30/2016 1328    ALKPHOS 79 11/30/2016 1328    AST 25 11/30/2016 1328    ALT 15 11/30/2016 1328    BILITOT 0.28 (L) 11/30/2016 1328             Assessment:    1. Right breast invasive ductal carcinoma( ER+ TN+ Her+),T2N0M0,  initial diagnosis was made in November 2014 and unfortunately treatment was delayed because of traumatic back injury. Cancer progressed. Started on TCH-P around that 9th of September and she did relatively well. Clinically responding. Completed 6 cycles. Continued on Herceptin. Lumpectomy 5/27/16 showed residual 5 mm invasive ductal carcinoma. Arimmidex was started , but she developed psychitric side effects  And hot flashes so it is stopped   2. Evidence of iron deficiency with low MCV, s/p Feraheme. Anemia complicated by chemotherapy. Will repeat Iron studies today. 3. Chronic back pain s/p multiple surgery. 4. Diarrhea related to Perjeta ( Pertuzmab)  5. DM II, well controlled. 7.        TSH: She will need repeat TSH level       Plan:   Clinically doing well with no evidence of recurrence. Explained importance of endocrine therapy and potential side effects. Did not start Aromasin yet.  Decided not to use it.  will continue Celexa to 40 mg   f/u in 6 months

## 2018-01-26 ENCOUNTER — TELEPHONE (OUTPATIENT)
Dept: INFUSION THERAPY | Facility: MEDICAL CENTER | Age: 63
End: 2018-01-26

## 2018-02-22 ENCOUNTER — HOSPITAL ENCOUNTER (OUTPATIENT)
Dept: INTERVENTIONAL RADIOLOGY/VASCULAR | Age: 63
Discharge: HOME | End: 2018-02-24
Payer: MEDICARE

## 2018-02-22 VITALS — HEART RATE: 65 BPM | SYSTOLIC BLOOD PRESSURE: 154 MMHG | DIASTOLIC BLOOD PRESSURE: 75 MMHG | RESPIRATION RATE: 16 BRPM

## 2018-02-22 DIAGNOSIS — C50.111 MALIGNANT NEOPLASM OF CENTRAL PORTION OF RIGHT FEMALE BREAST, UNSPECIFIED ESTROGEN RECEPTOR STATUS (HCC): ICD-10-CM

## 2018-02-22 PROCEDURE — 36589 REMOVAL TUNNELED CV CATH: CPT | Performed by: RADIOLOGY

## 2018-02-22 PROCEDURE — 77001 FLUOROGUIDE FOR VEIN DEVICE: CPT | Performed by: RADIOLOGY

## 2018-02-22 RX ORDER — ACETAMINOPHEN 325 MG/1
650 TABLET ORAL EVERY 4 HOURS PRN
Status: DISCONTINUED | OUTPATIENT
Start: 2018-02-22 | End: 2018-02-25 | Stop reason: HOSPADM

## 2018-02-22 NOTE — PROGRESS NOTES
Pt tolerated procedure without distress. wound closed with absorbable sutures and skin glue discharge instructions reviewed understanding verbalized pt released via w/c with daughter in nad

## 2018-05-07 ENCOUNTER — HOSPITAL ENCOUNTER (OUTPATIENT)
Facility: MEDICAL CENTER | Age: 63
End: 2018-05-07
Payer: MEDICARE

## 2018-05-08 ENCOUNTER — TELEPHONE (OUTPATIENT)
Dept: INFUSION THERAPY | Facility: MEDICAL CENTER | Age: 63
End: 2018-05-08

## 2018-06-18 ENCOUNTER — TELEPHONE (OUTPATIENT)
Dept: INFUSION THERAPY | Facility: MEDICAL CENTER | Age: 63
End: 2018-06-18

## 2018-08-13 ENCOUNTER — TELEPHONE (OUTPATIENT)
Dept: ONCOLOGY | Age: 63
End: 2018-08-13

## 2018-10-16 ENCOUNTER — HOSPITAL ENCOUNTER (OUTPATIENT)
Facility: MEDICAL CENTER | Age: 63
End: 2018-10-16
Payer: MEDICARE

## 2018-10-23 ENCOUNTER — TELEPHONE (OUTPATIENT)
Dept: ONCOLOGY | Age: 63
End: 2018-10-23

## 2018-11-01 ENCOUNTER — HOSPITAL ENCOUNTER (EMERGENCY)
Age: 63
Discharge: HOME | End: 2018-11-01
Attending: EMERGENCY MEDICINE
Payer: MEDICARE

## 2018-11-01 ENCOUNTER — APPOINTMENT (OUTPATIENT)
Dept: GENERAL RADIOLOGY | Age: 63
End: 2018-11-01
Payer: MEDICARE

## 2018-11-01 ENCOUNTER — APPOINTMENT (OUTPATIENT)
Dept: CT IMAGING | Age: 63
End: 2018-11-01
Payer: MEDICARE

## 2018-11-01 VITALS
BODY MASS INDEX: 31.65 KG/M2 | DIASTOLIC BLOOD PRESSURE: 61 MMHG | RESPIRATION RATE: 16 BRPM | HEIGHT: 65 IN | TEMPERATURE: 98.2 F | SYSTOLIC BLOOD PRESSURE: 132 MMHG | WEIGHT: 190 LBS | OXYGEN SATURATION: 98 % | HEART RATE: 74 BPM

## 2018-11-01 DIAGNOSIS — S09.90XA CLOSED HEAD INJURY, INITIAL ENCOUNTER: ICD-10-CM

## 2018-11-01 DIAGNOSIS — R55 SYNCOPE AND COLLAPSE: Primary | ICD-10-CM

## 2018-11-01 DIAGNOSIS — Z76.89 SLEEP CONCERN: ICD-10-CM

## 2018-11-01 DIAGNOSIS — S01.81XA FACIAL LACERATION, INITIAL ENCOUNTER: ICD-10-CM

## 2018-11-01 LAB
ABSOLUTE EOS #: 0.15 K/UL (ref 0–0.4)
ABSOLUTE IMMATURE GRANULOCYTE: ABNORMAL K/UL (ref 0–0.3)
ABSOLUTE LYMPH #: 1.62 K/UL (ref 1–4.8)
ABSOLUTE MONO #: 0.73 K/UL (ref 0.1–1.2)
ANION GAP SERPL CALCULATED.3IONS-SCNC: 11 MMOL/L (ref 9–17)
BASOPHILS # BLD: 0 % (ref 0–2)
BASOPHILS ABSOLUTE: 0.02 K/UL (ref 0–0.2)
BUN BLDV-MCNC: 16 MG/DL (ref 8–23)
BUN/CREAT BLD: ABNORMAL (ref 9–20)
CALCIUM SERPL-MCNC: 9.4 MG/DL (ref 8.6–10.4)
CHLORIDE BLD-SCNC: 102 MMOL/L (ref 98–107)
CO2: 26 MMOL/L (ref 20–31)
CREAT SERPL-MCNC: 0.58 MG/DL (ref 0.5–0.9)
DIFFERENTIAL TYPE: ABNORMAL
EKG ATRIAL RATE: 69 BPM
EKG P AXIS: 64 DEGREES
EKG P-R INTERVAL: 128 MS
EKG Q-T INTERVAL: 384 MS
EKG QRS DURATION: 80 MS
EKG QTC CALCULATION (BAZETT): 411 MS
EKG R AXIS: 62 DEGREES
EKG T AXIS: 53 DEGREES
EKG VENTRICULAR RATE: 69 BPM
EOSINOPHILS RELATIVE PERCENT: 2 % (ref 1–4)
GFR AFRICAN AMERICAN: >60 ML/MIN
GFR NON-AFRICAN AMERICAN: >60 ML/MIN
GFR SERPL CREATININE-BSD FRML MDRD: ABNORMAL ML/MIN/{1.73_M2}
GFR SERPL CREATININE-BSD FRML MDRD: ABNORMAL ML/MIN/{1.73_M2}
GLUCOSE BLD-MCNC: 110 MG/DL (ref 70–99)
HCT VFR BLD CALC: 35.2 % (ref 36–46)
HEMOGLOBIN: 11.1 G/DL (ref 12–16)
IMMATURE GRANULOCYTES: ABNORMAL %
LYMPHOCYTES # BLD: 20 % (ref 24–44)
MCH RBC QN AUTO: 29 PG (ref 26–34)
MCHC RBC AUTO-ENTMCNC: 31.5 G/DL (ref 31–37)
MCV RBC AUTO: 91.9 FL (ref 80–100)
MONOCYTES # BLD: 9 % (ref 2–11)
NRBC AUTOMATED: ABNORMAL PER 100 WBC
PDW BLD-RTO: 15.1 % (ref 12.5–15.4)
PLATELET # BLD: 185 K/UL (ref 140–450)
PLATELET ESTIMATE: ABNORMAL
PMV BLD AUTO: 10.2 FL (ref 8–14)
POTASSIUM SERPL-SCNC: 3.7 MMOL/L (ref 3.7–5.3)
RBC # BLD: 3.83 M/UL (ref 4–5.2)
RBC # BLD: ABNORMAL 10*6/UL
SEG NEUTROPHILS: 69 % (ref 36–66)
SEGMENTED NEUTROPHILS ABSOLUTE COUNT: 5.49 K/UL (ref 1.8–7.7)
SODIUM BLD-SCNC: 139 MMOL/L (ref 135–144)
TROPONIN INTERP: NORMAL
TROPONIN T: <0.03 NG/ML
WBC # BLD: 8 K/UL (ref 3.5–11)
WBC # BLD: ABNORMAL 10*3/UL

## 2018-11-01 PROCEDURE — 70450 CT HEAD/BRAIN W/O DYE: CPT

## 2018-11-01 PROCEDURE — 6370000000 HC RX 637 (ALT 250 FOR IP): Performed by: PHYSICIAN ASSISTANT

## 2018-11-01 PROCEDURE — 12001 RPR S/N/AX/GEN/TRNK 2.5CM/<: CPT

## 2018-11-01 PROCEDURE — 93005 ELECTROCARDIOGRAM TRACING: CPT

## 2018-11-01 PROCEDURE — 84484 ASSAY OF TROPONIN QUANT: CPT

## 2018-11-01 PROCEDURE — 73080 X-RAY EXAM OF ELBOW: CPT

## 2018-11-01 PROCEDURE — 99285 EMERGENCY DEPT VISIT HI MDM: CPT

## 2018-11-01 PROCEDURE — 36415 COLL VENOUS BLD VENIPUNCTURE: CPT

## 2018-11-01 PROCEDURE — 80048 BASIC METABOLIC PNL TOTAL CA: CPT

## 2018-11-01 PROCEDURE — 85025 COMPLETE CBC W/AUTO DIFF WBC: CPT

## 2018-11-01 PROCEDURE — 72125 CT NECK SPINE W/O DYE: CPT

## 2018-11-01 RX ORDER — GINSENG 100 MG
CAPSULE ORAL ONCE
Status: COMPLETED | OUTPATIENT
Start: 2018-11-01 | End: 2018-11-01

## 2018-11-01 RX ORDER — ACETAMINOPHEN 325 MG/1
650 TABLET ORAL ONCE
Status: COMPLETED | OUTPATIENT
Start: 2018-11-01 | End: 2018-11-01

## 2018-11-01 RX ORDER — LIDOCAINE HYDROCHLORIDE AND EPINEPHRINE 10; 10 MG/ML; UG/ML
20 INJECTION, SOLUTION INFILTRATION; PERINEURAL ONCE
Status: DISCONTINUED | OUTPATIENT
Start: 2018-11-01 | End: 2018-11-01 | Stop reason: HOSPADM

## 2018-11-01 RX ADMIN — BACITRACIN: 500 OINTMENT TOPICAL at 14:26

## 2018-11-01 RX ADMIN — Medication 3 ML: at 12:14

## 2018-11-01 RX ADMIN — ACETAMINOPHEN 650 MG: 325 TABLET ORAL at 13:02

## 2018-11-01 ASSESSMENT — ENCOUNTER SYMPTOMS
NAUSEA: 0
COLOR CHANGE: 1
COUGH: 0
EYE REDNESS: 0
SORE THROAT: 0
ABDOMINAL PAIN: 0
BACK PAIN: 1
EYE DISCHARGE: 0
VOMITING: 0
SHORTNESS OF BREATH: 0

## 2018-11-01 ASSESSMENT — PAIN DESCRIPTION - LOCATION: LOCATION: HEAD

## 2018-11-01 ASSESSMENT — PAIN DESCRIPTION - DESCRIPTORS: DESCRIPTORS: ACHING

## 2018-11-01 ASSESSMENT — PAIN SCALES - GENERAL
PAINLEVEL_OUTOF10: 8
PAINLEVEL_OUTOF10: 8

## 2018-11-01 ASSESSMENT — PAIN DESCRIPTION - PAIN TYPE: TYPE: ACUTE PAIN

## 2018-11-01 ASSESSMENT — PAIN DESCRIPTION - ORIENTATION: ORIENTATION: LEFT

## 2018-11-01 NOTE — ED PROVIDER NOTES
[facility]  [address] [place] [address]  Phone: [phone]  Fax: [phone]      Pt Name: Jayne Vazquez  MRN: [id number]  [place] 1955  Date of evaluation: 11/1/2018      CHIEF COMPLAINT       Chief Complaint   Patient presents with    Fall    Laceration       HISTORY OF PRESENT ILLNESS   (Location, Quality, Severity, Duration, Timing, Context, ModifyingFactors, Associated Signs and Symptoms)     Jayne Vazquez is a 70 y.o. female who presents to the ER with her daughter for evaluation following a syncopal episode. Patient states that she awoke this morning with some discomfort in her back and neck. She walked into the kitchen with the plan to make coffee. Patient had a syncopal episode. Patient states that she had no warning that she was going to pass out. This has happened several times in the past.  The patient's daughter states that she has even fallen asleep in the middle of activities. She is even drowsy during the day. Not sleep well during the night. May sleep for 2 hours at a time. Does not wake up feeling refreshed. She hit her left forehead against something in the kitchen and sustained a laceration just above the left eyebrow. Patient is complaining of a generalized headache. She has had no change in vision. He has had no vomiting. She is complaining of her chronic neck pain. She denies chest pain and difficulty breathing. Patient has a history of breast cancer and states she has completed chemotherapy in 2017. She reports having another fall 2 weeks prior. At that time, she fell hitting her head. She also sustained an injury to the right elbow. Patient was not evaluated by a physician following that fall. Patient's last tetanus was May 2018. Patient rates her acute headache pain at 8/10. Patient has not taken any medication for her headache pain. Nursing Notes were reviewed.     REVIEW OF SYSTEMS     (2-9 systems for level 4, 10 tablet by mouth daily, Disp-30 tablet, R-0      lidocaine-prilocaine (EMLA) 2.5-2.5 % cream Apply topically as needed. , Disp-30 g, R-0, Normal      tiZANidine (ZANAFLEX) 4 MG tablet Take 4 mg by mouth 3 times daily as needed. oxyCODONE (OXY-IR) 15 MG immediate release tablet Take 15 mg by mouth every 6 hours as needed for Pain  . Historical Med      Probiotic Product (PROBIOTIC DAILY PO) Take  by mouth daily. Multiple Vitamins-Minerals (THERAPEUTIC MULTIVITAMIN-MINERALS) tablet Take 1 tablet by mouth daily. omeprazole (PRILOSEC) 20 MG capsule Take 20 mg by mouth daily. hydrochlorothiazide (HYDRODIURIL) 25 MG tablet Take 25 mg by mouth daily. ALPRAZolam (XANAX) 0.5 MG tablet Take 0.5 mg by mouth 4 times daily. cloNIDine (CATAPRES) 0.1 MG tablet Take 0.1 mg by mouth 2 times daily. amoxicillin-clavulanate (AUGMENTIN) 875-125 MG per tablet Take 1 tablet by mouth 2 times daily      aspirin 81 MG EC tablet Take 1 tablet by mouth daily, Disp-30 tablet, R-3      citalopram (CELEXA) 20 MG tablet Take 40 mg by mouth daily       cyanocobalamin 1000 MCG/ML injection Inject 1,000 mcg into the muscle every 7 days. 1-2 cc per week           ALLERGIES     is allergic to neurontin [gabapentin]. FAMILY HISTORY     indicated that her mother is alive. She indicated that her father is alive. She indicated that all of her three sisters are alive. She indicated that her brother is alive. SOCIAL HISTORY      reports that she has never smoked. She has never used smokeless tobacco. She reports that she does not drink alcohol or use drugs. PHYSICAL EXAM     (7 for level 4, 8 or more for level 5)    ED Triage Vitals [11/01/18 1122]   BP Temp Temp Source Pulse Resp SpO2 Height Weight   132/61 98.2 °F (36.8 °C) Oral 74 16 98 % 5' 5\" (1.651 m) 190 lb (86.2 kg)     Physical Exam   Constitutional: She is oriented to person, place, and time. No distress. Overweight. Nontoxic.    HENT:   Head: Head is XRAY VIEWS OF THE RIGHT ELBOW    11/1/2018 12:55 pm    COMPARISON:  None. HISTORY:  ORDERING SYSTEM PROVIDED HISTORY: pain; fall 2 weeks ago  TECHNOLOGIST PROVIDED HISTORY:  pain; fall 2 weeks ago  Ordering Physician Provided Reason for Exam: Pt fell 2 weeks ago and has  posterior elbow pain and bruising  Acuity: Acute  Type of Exam: Initial  Mechanism of Injury: fall 2 weeks ago    FINDINGS:  Mineralization and alignment are satisfactory.  No acute fracture,  dislocation, or effusion is noted.  Mild degenerative changes are evident.                    CT HEAD WO CONTRAST (Preliminary result)   Result time 11/01/18 13:11:24   Preliminary result by Nurys Stinson MD (11/01/18 13:11:24)                Impression:    No acute intracranial abnormality. Postoperative changes of the cervical spine without evidence of acute  fracture. Cervical spine degenerative changes. Narrative:    EXAMINATION:  CT OF THE HEAD WITHOUT CONTRAST; CT OF THE CERVICAL SPINE WITHOUT CONTRAST  11/1/2018 12:54 pm    TECHNIQUE:  CT of the head was performed without the administration of intravenous  contrast. Dose modulation, iterative reconstruction, and/or weight based  adjustment of the mA/kV was utilized to reduce the radiation dose to as low  as reasonably achievable.; CT of the cervical spine was performed without the  administration of intravenous contrast. Multiplanar reformatted images are  provided for review. Dose modulation, iterative reconstruction, and/or weight  based adjustment of the mA/kV was utilized to reduce the radiation dose to as  low as reasonably achievable.     COMPARISON:  Head CT dated May 16, 2016    HISTORY:  ORDERING SYSTEM PROVIDED HISTORY: multiple falls; H/O breast cancer  TECHNOLOGIST PROVIDED HISTORY:    Ordering Physician Provided Reason for Exam: head pain  Acuity: Acute  Type of Exam: Initial  Mechanism of Injury: fall  Relevant Medical/Surgical History: Hx DM, HTN, Breast in  good alignment.  Craniocervical and atlantoaxial articulation maintained.  No  fracture is identified. DEGENERATIVE CHANGES: Multilevel disc degeneration of the cervical spine with  facet arthrosis greatest in the upper cervical spine. SOFT TISSUES: The paraspinal soft tissues show no acute findings. Lung apices  are clear.                Preliminary result by Isamar Lin MD (11/01/18 13:09:05)                Impression:    No acute intracranial abnormality. Postoperative changes of the cervical spine without evidence of acute  fracture.     Cervical spine degenerative changes.                  LABS:  Results for orders placed or performed during the hospital encounter of 11/01/18   CBC Auto Differential   Result Value Ref Range    WBC 8.0 3.5 - 11.0 k/uL    RBC 3.83 (L) 4.0 - 5.2 m/uL    Hemoglobin 11.1 (L) 12.0 - 16.0 g/dL    Hematocrit 35.2 (L) 36 - 46 %    MCV 91.9 80 - 100 fL    MCH 29.0 26 - 34 pg    MCHC 31.5 31 - 37 g/dL    RDW 15.1 12.5 - 15.4 %    Platelets 967 899 - 785 k/uL    MPV 10.2 8.0 - 14.0 fL    NRBC Automated NOT REPORTED per 100 WBC    Differential Type NOT REPORTED     Immature Granulocytes NOT REPORTED 0 %    Absolute Immature Granulocyte NOT REPORTED 0.00 - 0.30 k/uL    WBC Morphology NOT REPORTED     RBC Morphology NOT REPORTED     Platelet Estimate NOT REPORTED     Seg Neutrophils 69 (H) 36 - 66 %    Lymphocytes 20 (L) 24 - 44 %    Monocytes 9 2 - 11 %    Eosinophils % 2 1 - 4 %    Basophils 0 0 - 2 %    Segs Absolute 5.49 1.8 - 7.7 k/uL    Absolute Lymph # 1.62 1.0 - 4.8 k/uL    Absolute Mono # 0.73 0.1 - 1.2 k/uL    Absolute Eos # 0.15 0.0 - 0.4 k/uL    Basophils # 0.02 0.0 - 0.2 k/uL   Basic Metabolic Panel   Result Value Ref Range    Glucose 110 (H) 70 - 99 mg/dL    BUN 16 8 - 23 mg/dL    CREATININE 0.58 0.50 - 0.90 mg/dL    Bun/Cre Ratio NOT REPORTED 9 - 20    Calcium 9.4 8.6 - 10.4 mg/dL    Sodium 139 135 - 144 mmol/L    Potassium 3.7 3.7 - 5.3 mmol/L    Chloride 102 98 - 107 mmol/L    CO2 26 20 - 31 mmol/L    Anion Gap 11 9 - 17 mmol/L    GFR Non-African American >60 >60 mL/min    GFR African American >60 >60 mL/min    GFR Comment          GFR Staging NOT REPORTED    Troponin   Result Value Ref Range    Troponin T <0.03 <0.03 ng/mL    Troponin Interp         EKG 12 Lead   Result Value Ref Range    Ventricular Rate 69 BPM    Atrial Rate 69 BPM    P-R Interval 128 ms    QRS Duration 80 ms    Q-T Interval 384 ms    QTc Calculation (Bazett) 411 ms    P Axis 64 degrees    R Axis 62 degrees    T Axis 53 degrees     MDM:   Patient presents to the ER for evaluation following a syncopal episode. Patient states that she was walking into the kitchen to make coffee after awakening with neck pain and back pain this morning. Patient states that she must have passed out. She awoke on the floor with a laceration to the left forehead. Patient is accompanied by her daughter. Patient has had several falls over the past few months. Daughter states that she is even nodded off to sleep in the middle of activities. Does not sleep well during the night. Patient is complaining of generalized headache. Patient has a history of breast cancer. Patient has a pain pump. I will obtain CT scan imaging of the head and cervical spine. I will also obtain a plain film x-ray of the left elbow which the patient injured 2 weeks ago when she fell. I will obtain an EKG and labs to include CBC with differential, basic metabolic panel and troponin. The laceration over the left forehead will be repaired with sutures.     EMERGENCY DEPARTMENT COURSE:   Vitals:    Vitals:    11/01/18 1122   BP: 132/61   Pulse: 74   Resp: 16   Temp: 98.2 °F (36.8 °C)   TempSrc: Oral   SpO2: 98%   Weight: 86.2 kg (190 lb)   Height: 5' 5\" (1.651 m)     -------------------------  BP: 132/61, Temp: 98.2 °F (36.8 °C), Pulse: 74, Resp: 16    The patient was given the following medications:  Orders Placed This Encounter   Medications   

## 2018-11-06 ENCOUNTER — HOSPITAL ENCOUNTER (OUTPATIENT)
Facility: MEDICAL CENTER | Age: 63
End: 2018-11-06
Payer: MEDICARE

## 2018-11-13 ENCOUNTER — TELEPHONE (OUTPATIENT)
Dept: INFUSION THERAPY | Facility: MEDICAL CENTER | Age: 63
End: 2018-11-13

## 2018-11-13 NOTE — TELEPHONE ENCOUNTER
I RECEIVED A CALL FROM ASHWIN'S DAUGHTER STATING THAT SHE HAD A FALL AND THE NEUROLOGIST DOES NOT WANT HER TO GO OUT FOR THE REST OF TODAY. DAUGHTER STATES SHE WILL CALL BACK TO RESCHEDULE.

## 2018-11-19 ENCOUNTER — APPOINTMENT (OUTPATIENT)
Dept: GENERAL RADIOLOGY | Age: 63
End: 2018-11-19
Payer: MEDICARE

## 2018-11-19 ENCOUNTER — HOSPITAL ENCOUNTER (EMERGENCY)
Age: 63
Discharge: HOME | End: 2018-11-19
Attending: SPECIALIST
Payer: MEDICARE

## 2018-11-19 ENCOUNTER — APPOINTMENT (OUTPATIENT)
Dept: CT IMAGING | Age: 63
End: 2018-11-19
Payer: MEDICARE

## 2018-11-19 VITALS
DIASTOLIC BLOOD PRESSURE: 69 MMHG | RESPIRATION RATE: 16 BRPM | WEIGHT: 188 LBS | HEIGHT: 66 IN | OXYGEN SATURATION: 96 % | BODY MASS INDEX: 30.22 KG/M2 | HEART RATE: 65 BPM | TEMPERATURE: 98.1 F | SYSTOLIC BLOOD PRESSURE: 131 MMHG

## 2018-11-19 DIAGNOSIS — S39.012A STRAIN OF LUMBAR REGION, INITIAL ENCOUNTER: ICD-10-CM

## 2018-11-19 DIAGNOSIS — S70.01XA CONTUSION OF RIGHT HIP, INITIAL ENCOUNTER: Primary | ICD-10-CM

## 2018-11-19 DIAGNOSIS — S83.91XA SPRAIN OF RIGHT KNEE, UNSPECIFIED LIGAMENT, INITIAL ENCOUNTER: ICD-10-CM

## 2018-11-19 PROCEDURE — 73562 X-RAY EXAM OF KNEE 3: CPT

## 2018-11-19 PROCEDURE — 99284 EMERGENCY DEPT VISIT MOD MDM: CPT

## 2018-11-19 PROCEDURE — 72100 X-RAY EXAM L-S SPINE 2/3 VWS: CPT

## 2018-11-19 PROCEDURE — 73502 X-RAY EXAM HIP UNI 2-3 VIEWS: CPT

## 2018-11-19 PROCEDURE — 72131 CT LUMBAR SPINE W/O DYE: CPT

## 2018-11-19 ASSESSMENT — PAIN DESCRIPTION - PAIN TYPE: TYPE: ACUTE PAIN

## 2018-11-19 ASSESSMENT — PAIN SCALES - GENERAL: PAINLEVEL_OUTOF10: 8

## 2018-11-19 ASSESSMENT — PAIN DESCRIPTION - ORIENTATION: ORIENTATION: RIGHT

## 2018-11-19 ASSESSMENT — PAIN DESCRIPTION - ONSET: ONSET: SUDDEN

## 2018-11-19 ASSESSMENT — PAIN DESCRIPTION - FREQUENCY: FREQUENCY: CONTINUOUS

## 2018-11-19 ASSESSMENT — PAIN DESCRIPTION - LOCATION: LOCATION: HIP;KNEE

## 2018-11-19 ASSESSMENT — PAIN DESCRIPTION - DESCRIPTORS: DESCRIPTORS: SHARP;ACHING

## 2018-11-19 ASSESSMENT — PAIN DESCRIPTION - PROGRESSION: CLINICAL_PROGRESSION: NOT CHANGED

## 2018-11-19 NOTE — ED NOTES
Pt returned to room from imaging. Pt resting in bed in NAD, call light in reach, skin pink warm and dry, no needs at this time.      Ken Whitney RN  11/19/18 [id number]

## 2018-11-19 NOTE — ED PROVIDER NOTES
due to work responsibilities. Hx of lower back pain/issues. Nursing Notes were reviewed. REVIEW OF SYSTEMS    (2-9 systems for level 4, 10 or more for level 5)     Review of Systems   Constitutional: Negative. HENT: Negative. Eyes: Negative. Respiratory: Negative. Cardiovascular: Negative. Gastrointestinal: Negative. Musculoskeletal: Negative. Endocrine: Negative. Genitourinary: Negative. Skin: Negative. Allergic/Immunologic: Negative. Neurological: Negative. Hematological: Negative. Psychiatric/Behavioral: Negative. Except as noted above the remainder of the review of systems was reviewed and negative. PAST MEDICAL HISTORY   History reviewed. Past Medical History:   Diagnosis Date    Acute osteomyelitis of toe of left foot ([address]     Arthritis     Cancer ([facility] [address]     breast cancer oct 2014    Chronic kidney disease     Depression with anxiety     Diabetes mellitus ([facility] [address]     Hashimoto thyroiditis, fibrous variant     Hypertension     Kidney stone     Other disorders of kidney and ureter in diseases classified elsewhere     Psychiatric problem     Status post chemotherapy, time since less than 4 weeks     Thoracic outlet syndrome          SURGICAL HISTORY     History reviewed.     Past Surgical History:   Procedure Laterality Date    APPENDECTOMY      BONE GRAFT      CARPAL TUNNEL RELEASE      CERVICAL FUSION      CHOLECYSTECTOMY      COLONOSCOPY      DENTAL SURGERY  08/22/2017    FIXATION KYPHOPLASTY      FRACTURE SURGERY      HAND SURGERY      HYSTERECTOMY      LAMINECTOMY      LAMINECTOMY      LAPAROTOMY      LITHOTRIPSY      LITHOTRIPSY      TONSILLECTOMY      TUNNELED VENOUS PORT PLACEMENT           CURRENT MEDICATIONS       Discharge Medication List as of 11/19/2018  7:30 PM      CONTINUE these medications which have NOT CHANGED    Details   amoxicillin-clavulanate (AUGMENTIN) 875-125 MG per tablet Take 1 tablet by mouth 2 times daily

## 2018-11-19 NOTE — ED NOTES
Pt presents to the ED with a c/c of right leg pain after a fall. Pt states that she fell approx 10 days ago and was seen here. Pt states that she has fallen once since then and did not get checked out and then patient fell today onto her right leg and knee. Pt to room via wheelchair. Pts daughter also states that the patient has been \"falling asleep\" while doing ADL and eating at the table which is not normal for her. Pt denies chest pain, SOB, nausea, vomiting, diarrhea, fevers, or chills. Pt resting in bed in NAD, skin pink warm and dry, respirations even and unlabored and call light within reach.      Krysta Plaza RN  11/19/18 1340

## 2018-11-20 NOTE — ED PROVIDER NOTES
or loss of consciousness. She denies any headache, neck pain, tingling, numbness or weakness in any of the extremities. She denies any chest pain, shortness of breath, abdominal pain, vomiting or diarrhea. Patient has chronic back pain and is on fentanyl pain pump as well as oxycodone and OxyContin for breakthrough pain. Patient is afebrile and vital signs are stable. Her lungs are clear to auscultation. There are no external signs of head injury. Cervical spine is nontender. Lungs are clear to auscultation. Patient has vague paraspinal tenderness on the right side. There is no midline tenderness. She has decreased range of motion of the right hip and knee joint secondary to pain but there is no obvious deformity. Neurovascular examination is intact distally. No evidence of compartment syndrome. Radiological evaluation is unremarkable. Patient was discharged home in stable condition. She is advised to follow up with PCP, return if worse.         Brisa Martinez MD  11/20/18 [id number]